# Patient Record
Sex: MALE | Race: WHITE | Employment: PART TIME | URBAN - METROPOLITAN AREA
[De-identification: names, ages, dates, MRNs, and addresses within clinical notes are randomized per-mention and may not be internally consistent; named-entity substitution may affect disease eponyms.]

---

## 2023-07-15 ENCOUNTER — HOSPITAL ENCOUNTER (EMERGENCY)
Age: 23
Discharge: HOME | End: 2023-07-15
Payer: COMMERCIAL

## 2023-07-15 ENCOUNTER — APPOINTMENT (OUTPATIENT)
Dept: GENERAL RADIOLOGY | Age: 23
End: 2023-07-15
Attending: NURSE PRACTITIONER
Payer: COMMERCIAL

## 2023-07-15 VITALS
DIASTOLIC BLOOD PRESSURE: 71 MMHG | TEMPERATURE: 98 F | WEIGHT: 165 LBS | HEIGHT: 72 IN | SYSTOLIC BLOOD PRESSURE: 120 MMHG | BODY MASS INDEX: 22.35 KG/M2 | RESPIRATION RATE: 16 BRPM | HEART RATE: 71 BPM | OXYGEN SATURATION: 98 %

## 2023-07-15 DIAGNOSIS — S61.325A: ICD-10-CM

## 2023-07-15 DIAGNOSIS — S61.213A LACERATION OF LEFT MIDDLE FINGER WITHOUT FOREIGN BODY WITHOUT DAMAGE TO NAIL, INITIAL ENCOUNTER: Primary | ICD-10-CM

## 2023-07-15 PROCEDURE — 73130 X-RAY EXAM OF HAND: CPT | Performed by: NURSE PRACTITIONER

## 2023-07-15 PROCEDURE — 99284 EMERGENCY DEPT VISIT MOD MDM: CPT

## 2023-07-15 PROCEDURE — 12002 RPR S/N/AX/GEN/TRNK2.6-7.5CM: CPT

## 2023-07-15 PROCEDURE — 96372 THER/PROPH/DIAG INJ SC/IM: CPT

## 2023-07-15 PROCEDURE — 90471 IMMUNIZATION ADMIN: CPT

## 2023-07-15 RX ORDER — CEFTRIAXONE 500 MG/1
1000 INJECTION, POWDER, FOR SOLUTION INTRAMUSCULAR; INTRAVENOUS ONCE
Status: COMPLETED | OUTPATIENT
Start: 2023-07-15 | End: 2023-07-15

## 2023-07-15 RX ORDER — CEPHALEXIN 500 MG/1
500 CAPSULE ORAL 4 TIMES DAILY
Qty: 40 CAPSULE | Refills: 0 | Status: SHIPPED | OUTPATIENT
Start: 2023-07-15 | End: 2023-07-25

## 2023-07-15 NOTE — DISCHARGE INSTRUCTIONS
Keep laceration site clean and dry at all times. Please have wound checked in 2 days. Sutures to be removed in 7 to 10 days.

## 2023-07-17 ENCOUNTER — OFFICE VISIT (OUTPATIENT)
Dept: FAMILY MEDICINE CLINIC | Facility: CLINIC | Age: 23
End: 2023-07-17
Payer: COMMERCIAL

## 2023-07-17 VITALS
SYSTOLIC BLOOD PRESSURE: 120 MMHG | BODY MASS INDEX: 21.4 KG/M2 | RESPIRATION RATE: 16 BRPM | HEART RATE: 74 BPM | WEIGHT: 158 LBS | HEIGHT: 72 IN | OXYGEN SATURATION: 98 % | DIASTOLIC BLOOD PRESSURE: 70 MMHG | TEMPERATURE: 97 F

## 2023-07-17 DIAGNOSIS — Z51.89 ENCOUNTER FOR POST-TRAUMATIC WOUND CHECK: Primary | ICD-10-CM

## 2023-07-17 PROCEDURE — 3078F DIAST BP <80 MM HG: CPT | Performed by: FAMILY MEDICINE

## 2023-07-17 PROCEDURE — 99213 OFFICE O/P EST LOW 20 MIN: CPT | Performed by: FAMILY MEDICINE

## 2023-07-17 PROCEDURE — 3008F BODY MASS INDEX DOCD: CPT | Performed by: FAMILY MEDICINE

## 2023-07-17 PROCEDURE — 3074F SYST BP LT 130 MM HG: CPT | Performed by: FAMILY MEDICINE

## 2023-07-26 ENCOUNTER — HOSPITAL ENCOUNTER (EMERGENCY)
Age: 23
Discharge: HOME | End: 2023-07-26
Payer: COMMERCIAL

## 2023-07-26 VITALS
OXYGEN SATURATION: 98 % | BODY MASS INDEX: 22 KG/M2 | WEIGHT: 165 LBS | SYSTOLIC BLOOD PRESSURE: 113 MMHG | DIASTOLIC BLOOD PRESSURE: 71 MMHG | RESPIRATION RATE: 16 BRPM | HEART RATE: 69 BPM | TEMPERATURE: 98 F

## 2023-07-26 DIAGNOSIS — Z48.02 ENCOUNTER FOR REMOVAL OF SUTURES: Primary | ICD-10-CM

## 2023-07-26 NOTE — DISCHARGE INSTRUCTIONS
Continue to keep wound clean, dry, covered. Follow-up with your primary care provider. Go to ER with any new or worsening symptoms.

## 2023-10-31 ENCOUNTER — TELEPHONE (OUTPATIENT)
Dept: ORTHOPEDICS CLINIC | Facility: CLINIC | Age: 23
End: 2023-10-31

## 2023-10-31 DIAGNOSIS — M25.551 BILATERAL HIP PAIN: Primary | ICD-10-CM

## 2023-10-31 DIAGNOSIS — M25.552 BILATERAL HIP PAIN: Primary | ICD-10-CM

## 2023-10-31 NOTE — TELEPHONE ENCOUNTER
Patient self scheduled with Isaurar Hakan on 11/1/23 for Previously had Achilles injury right leg, now hips out of line, pain in back and in leg left side. Please advise if Sincer will see all of this and if imaging is needed prior to his appointment.

## 2023-10-31 NOTE — TELEPHONE ENCOUNTER
Future Appointments   Date Time Provider Matilde Chaudhry   11/1/2023  7:40 AM AKASH Hernandez [facility] [id number]       This patient is coming for LT Hip out of line, Lower back, and LT Leg. No prior imaging done yet. Please advise if views are needed for this appt. Thanks.       Patient may be reached at [phone]

## 2023-10-31 NOTE — TELEPHONE ENCOUNTER
We will get imaging prior to appt. All may be interconnected but will need to start with one part. Please ask which body part bothers him most and will go from there.

## 2023-11-01 ENCOUNTER — OFFICE VISIT (OUTPATIENT)
Dept: ORTHOPEDICS CLINIC | Facility: CLINIC | Age: 23
End: 2023-11-01
Payer: COMMERCIAL

## 2023-11-01 ENCOUNTER — HOSPITAL ENCOUNTER (OUTPATIENT)
Dept: GENERAL RADIOLOGY | Age: 23
Discharge: HOME | End: 2023-11-01
Attending: PHYSICIAN ASSISTANT
Payer: COMMERCIAL

## 2023-11-01 ENCOUNTER — MED REC SCAN ONLY (OUTPATIENT)
Dept: ORTHOPEDICS CLINIC | Facility: CLINIC | Age: 23
End: 2023-11-01

## 2023-11-01 VITALS — BODY MASS INDEX: 23.03 KG/M2 | HEIGHT: 72 IN | WEIGHT: 170 LBS

## 2023-11-01 DIAGNOSIS — M25.551 BILATERAL HIP PAIN: ICD-10-CM

## 2023-11-01 DIAGNOSIS — M25.552 BILATERAL HIP PAIN: ICD-10-CM

## 2023-11-01 DIAGNOSIS — G57.02 PIRIFORMIS SYNDROME OF LEFT SIDE: Primary | ICD-10-CM

## 2023-11-01 DIAGNOSIS — M25.852 LEFT HIP IMPINGEMENT SYNDROME: ICD-10-CM

## 2023-11-01 PROCEDURE — 73523 X-RAY EXAM HIPS BI 5/> VIEWS: CPT | Performed by: PHYSICIAN ASSISTANT

## 2023-11-01 PROCEDURE — 99203 OFFICE O/P NEW LOW 30 MIN: CPT | Performed by: PHYSICIAN ASSISTANT

## 2023-11-01 PROCEDURE — 3008F BODY MASS INDEX DOCD: CPT | Performed by: PHYSICIAN ASSISTANT

## 2023-12-11 ENCOUNTER — TELEPHONE (OUTPATIENT)
Dept: ORTHOPEDICS CLINIC | Facility: CLINIC | Age: 23
End: 2023-12-11

## 2023-12-11 NOTE — TELEPHONE ENCOUNTER
Patient's plan of care from [facility] in [facility]. Please advise if he needs to be seen? Or can we send letter?      Pended for review    LOV 11/01/2023

## 2024-01-01 ENCOUNTER — MED REC SCAN ONLY (OUTPATIENT)
Dept: ORTHOPEDICS CLINIC | Facility: CLINIC | Age: 24
End: 2024-01-01

## 2024-07-02 ENCOUNTER — TELEPHONE (OUTPATIENT)
Dept: ORTHOPEDICS CLINIC | Facility: CLINIC | Age: 24
End: 2024-07-02

## 2024-07-02 DIAGNOSIS — S99.911A INJURY OF RIGHT ANKLE, INITIAL ENCOUNTER: Primary | ICD-10-CM

## 2024-07-03 ENCOUNTER — HOSPITAL ENCOUNTER (OUTPATIENT)
Dept: GENERAL RADIOLOGY | Age: 24
Discharge: HOME | End: 2024-07-03
Attending: PHYSICIAN ASSISTANT
Payer: COMMERCIAL

## 2024-07-03 ENCOUNTER — OFFICE VISIT (OUTPATIENT)
Dept: ORTHOPEDICS CLINIC | Facility: CLINIC | Age: 24
End: 2024-07-03
Payer: COMMERCIAL

## 2024-07-03 VITALS — HEIGHT: 72 IN | WEIGHT: 170 LBS | BODY MASS INDEX: 23.03 KG/M2

## 2024-07-03 DIAGNOSIS — Z98.890 S/P ACHILLES TENDON REPAIR: Primary | ICD-10-CM

## 2024-07-03 DIAGNOSIS — R29.898 ANKLE WEAKNESS: ICD-10-CM

## 2024-07-03 DIAGNOSIS — S99.911A INJURY OF RIGHT ANKLE, INITIAL ENCOUNTER: ICD-10-CM

## 2024-07-03 PROCEDURE — 99213 OFFICE O/P EST LOW 20 MIN: CPT | Performed by: PHYSICIAN ASSISTANT

## 2024-07-03 PROCEDURE — 73610 X-RAY EXAM OF ANKLE: CPT | Performed by: PHYSICIAN ASSISTANT

## 2024-07-03 NOTE — H&P
[facility] - ORTHOPEDICS  [address] [place] [id number]  [phone]     NEW PATIENT VISIT - HISTORY AND PHYSICAL EXAMINATION     Name: Dmitri Sarmiento   MRN: [id number]  Date: 7/3/2024     CC: Right ankle pain.     REFERRED BY: EMMANUEL FRANKLIN    HPI:   Dmitri Sarmiento is a very pleasant 23 year old male who presents today for evaluation, consultation, and management of RIGHT achilles injury since May 2021- and underwent surgical repair in which he underwent right achilles tendon repair by Dr. Jim Zhu in 2021. He complains of swelling, pain and sitffness. He complains of ongoing weakness and has completed PT.       PMH:   No past medical history on file.    PAST SURGICAL HX:  Past Surgical History:   Procedure Laterality Date    Achilles tendon repair Right 05/2021       FAMILY HX:  No family history on file.    ALLERGIES:  Patient has no known allergies.    MEDICATIONS:   No current outpatient medications on file.       ROS: A comprehensive 14 point review of systems was performed and was negative aside from the aforementioned per history of present illness.    SOCIAL HX:  Social History     Occupational History    Not on file   Tobacco Use    Smoking status: Never    Smokeless tobacco: Never   Vaping Use    Vaping status: Never Used   Substance and Sexual Activity    Alcohol use: Yes     Alcohol/week: 5.0 standard drinks of alcohol     Types: 5 Standard drinks or equivalent per week    Drug use: Never    Sexual activity: Not on file       PE:   There were no vitals filed for this visit.  Estimated body mass index is 23.06 kg/m² as calculated from the following:    Height as of 11/1/23: 6' (1.829 m).    Weight as of 11/1/23: 170 lb (77.1 kg).    Physical Exam  Constitutional:       Appearance: Normal appearance.   HENT:      Head: Normocephalic and atraumatic.   Eyes:      Extraocular Movements: Extraocular movements intact.   Neck:      Musculoskeletal: Normal range of  motion and neck supple.   Cardiovascular:      Pulses: Normal pulses.   Pulmonary:      Effort: Pulmonary effort is normal. No respiratory distress.   Abdominal:      General: There is no distension.   Skin:     General: Skin is warm.      Capillary Refill: Capillary refill takes less than 2 seconds.      Findings: No bruising.   Neurological:      General: No focal deficit present.      Mental Status: Alert.   Psychiatric:         Mood and Affect: Mood normal.     Examination of the right foot/ankle demonstrates:     Skin is intact, warm and dry.     Inspection:   Atrophy: none    Effusion: none    Edema: none    Erythema: none    Hematoma: none      Palpation:   Anterior Talo-Fibular Ligament (ATFL): none    Fibular Ligament (PTFL): none    Calcaneo-Fibular Ligament (CFL): none    Achilles Tendon: none    Peroneal Tendon: none    Posterior Tib Tendon: none    Talar dome: none    Metatarsal bones: none    Joint line tenderness: none  Crepitation: none     ROM:   Dorsiflexion: 10 degrees.  Plantarflexion: 40 degrees.  Eversion:  20 degrees  Inversion: 30 degrees.    Strength: mild weakness   Gastroc muslce atrophy - intact malin   Special Tests:   Anterior Drawer Test ATFL Rupture: Negative  CFL Forced Inversion: Negative   Talar Dome:  Negative   Gait:  normal   Leg length: equal and symmetric  Alignment:  neutral     No obvious peripheral edema noted.   Distal neurovascular exam demonstrates normal perfusion, intact sensation to light touch and full strength.           Radiographic Examination/Diagnostics:  I personally viewed, independently interpreted and radiology report was reviewed.    X-rays Right Ankle, 7/3/2024- No evidence of fracture, foreign body or soft tissue injury.     IMPRESSION: Dmitri Sarmiento is a 23 year old male who presents with right achilles pain and weakness following surgical intervention at OSH in 2021.     PLAN:   We had a detailed discussion outlining the etiology, anatomy,  pathophysiology, and natural history of the patient's findings. Imaging was reviewed in detail and correlated to a 3-dimensional model of the patient's pathology.     We reviewed the treatment of this disease condition.   We recommended physical therapy to aid in strengthening, range of motion, functional improvement, and return to baseline activity.  The patient had the opportunity to ask questions and all questions were answered appropriately.      FOLLOW-UP:  6-8 weeks, no imaging needed.             Sincer CARMITA Arndt, PA-C Orthopedic Surgery / Sports Medicine Specialist  [facility]  [address] [place] [address].org  [email or web address]  t: [phone]  o: [phone]  f: [phone]    This note was dictated using Dragon software.  While it was briefly proofread prior to completion, some grammatical, spelling, and word choice errors due to dictation may still occur.

## 2024-07-08 ENCOUNTER — TELEPHONE (OUTPATIENT)
Dept: ORTHOPEDICS CLINIC | Facility: CLINIC | Age: 24
End: 2024-07-08

## 2024-07-08 NOTE — TELEPHONE ENCOUNTER
Patient was seen in office on 7/3 for right achilles  and needs a Doctors note in order to put his gym membership on hold. Please advise

## 2024-08-14 ENCOUNTER — OFFICE VISIT (OUTPATIENT)
Dept: ORTHOPEDICS CLINIC | Facility: CLINIC | Age: 24
End: 2024-08-14
Payer: COMMERCIAL

## 2024-08-14 DIAGNOSIS — Z98.890 S/P ACHILLES TENDON REPAIR: Primary | ICD-10-CM

## 2024-08-14 PROCEDURE — 99213 OFFICE O/P EST LOW 20 MIN: CPT | Performed by: PHYSICIAN ASSISTANT

## 2024-08-15 ENCOUNTER — OFFICE VISIT (OUTPATIENT)
Dept: FAMILY MEDICINE CLINIC | Facility: CLINIC | Age: 24
End: 2024-08-15
Payer: COMMERCIAL

## 2024-08-15 VITALS
BODY MASS INDEX: 21.43 KG/M2 | DIASTOLIC BLOOD PRESSURE: 80 MMHG | HEIGHT: 72 IN | RESPIRATION RATE: 16 BRPM | HEART RATE: 80 BPM | OXYGEN SATURATION: 98 % | WEIGHT: 158.25 LBS | TEMPERATURE: 97 F | SYSTOLIC BLOOD PRESSURE: 124 MMHG

## 2024-08-15 DIAGNOSIS — Z13.220 SCREENING CHOLESTEROL LEVEL: ICD-10-CM

## 2024-08-15 DIAGNOSIS — Z00.00 WELL ADULT EXAM: Primary | ICD-10-CM

## 2024-08-15 DIAGNOSIS — M54.6 CHRONIC BILATERAL THORACIC BACK PAIN: ICD-10-CM

## 2024-08-15 DIAGNOSIS — Z13.31 NEGATIVE DEPRESSION SCREENING: ICD-10-CM

## 2024-08-15 DIAGNOSIS — G89.29 CHRONIC BILATERAL THORACIC BACK PAIN: ICD-10-CM

## 2024-08-15 PROCEDURE — 99395 PREV VISIT EST AGE 18-39: CPT | Performed by: STUDENT IN AN ORGANIZED HEALTH CARE EDUCATION/TRAINING PROGRAM

## 2024-08-15 RX ORDER — NAPROXEN 500 MG/1
500 TABLET ORAL 2 TIMES DAILY PRN
Qty: 14 TABLET | Refills: 0 | Status: SHIPPED | OUTPATIENT
Start: 2024-08-15 | End: 2024-08-22

## 2024-08-15 NOTE — PROGRESS NOTES
Subjective:      Chief Complaint   Patient presents with    Physical    Lymph Node     Swollen on groin area for a couple of weeks - states it seem to go away now     HISTORY OF PRESENT ILLNESS  HPI  HPI obtained per patient report.  Dmitri Sarmiento is a pleasant 23 year old male presenting for an annual physical.   He notes intermittent upper back and low back pain for years. He is currently attending PT s/p R achilles tendon repair.   He notes some swelling around the R groin area for a few weeks which is now resolved.     PAST PATIENT HISTORY  History reviewed. No pertinent past medical history.  Past Surgical History:   Procedure Laterality Date    Achilles tendon repair Right 05/2021    Other surgical history  May 2021    Achilles       CURRENT MEDICATIONS  Outpatient Medications Marked as Taking for the 8/15/24 encounter (Office Visit) with Srinivas Hodges MD   Medication Sig Dispense Refill    naproxen 500 MG Oral Tab Take 1 tablet (500 mg total) by mouth 2 (two) times daily as needed. 14 tablet 0       HEALTH MAINTENANCE  Immunization History   Administered Date(s) Administered    >=3 YRS TRI  MULTIDOSE VIAL (98683) FLU CLINIC 10/13/2012, 10/08/2014    Covid-19 Vaccine Pfizer 30 mcg/0.3 ml 03/20/2021, 04/10/2021, 12/24/2021    DTAP 12/16/2000, 02/22/2001, 04/20/2001, 12/21/2001, 08/03/2006    FLUZONE 6 months and older PFS 0.5 ml (45306) 09/07/2016, 10/19/2017, 10/26/2018, 08/28/2020, 10/27/2021    HEP B, Ped/Adol 10/09/2000, 11/10/2000, 12/21/2000    HIB (HbOC) 12/16/2000, 02/22/2001, 04/20/2001, 12/21/2001    Hpv Virus Vaccine 9 Joelle Im 10/19/2017, 08/01/2018, 08/12/2019    IPV 12/16/2000, 02/22/2001, 04/20/2001, 08/03/2006    Influenza 10/31/2015    MMR 12/21/2001, 08/03/2006    Meningococcal-Menactra 09/09/2013, 08/01/2018    Pneumococcal (Prevnar 7) 12/16/2000, 02/22/2001, 03/29/2001, 04/20/2001    TDAP 08/16/2012, 07/15/2023    Varicella 08/12/2004, 08/16/2012       ALLERGIES AND DRUG REACTIONS  No Known  Allergies    Family History   Problem Relation Age of Onset    Other (brain aneurysm) Paternal Grandmother     Stroke Paternal Grandfather      Social History     Socioeconomic History    Marital status: Single   Tobacco Use    Smoking status: Never    Smokeless tobacco: Never   Vaping Use    Vaping status: Never Used   Substance and Sexual Activity    Alcohol use: Yes     Comment: couple a week - has cut back    Drug use: Never     Social Determinants of Health      [facility]    Social Connections    Received from [facility], [facility]    Housing Stability       Review of Systems   All other systems reviewed and are negative.         Objective:      /80   Pulse 80   Temp 97.1 °F (36.2 °C) (Temporal)   Resp 16   Ht 6' (1.829 m)   Wt 158 lb 4 oz (71.8 kg)   SpO2 98%   BMI 21.46 kg/m²   Body mass index is 21.46 kg/m².    Physical Exam  Vitals reviewed.   Constitutional:       General: He is not in acute distress.     Appearance: He is normal weight. He is not ill-appearing, toxic-appearing or diaphoretic.   HENT:      Head: Normocephalic and atraumatic.   Eyes:      General: No scleral icterus.        Right eye: No discharge.         Left eye: No discharge.      Extraocular Movements: Extraocular movements intact.      Conjunctiva/sclera: Conjunctivae normal.   Neck:      Thyroid: No thyroid mass, thyromegaly or thyroid tenderness.   Cardiovascular:      Rate and Rhythm: Normal rate and regular rhythm.      Heart sounds: Normal heart sounds.   Pulmonary:      Effort: Pulmonary effort is normal.      Breath sounds: Normal breath sounds.   Abdominal:      General: There is no distension.      Palpations: Abdomen is soft. There is no mass.      Tenderness: There is no abdominal tenderness. There is no guarding or rebound.      Hernia: No hernia is present.   Musculoskeletal:         General: No  swelling, tenderness or deformity. Normal range of motion.      Cervical back: Neck supple. No tenderness.      Right lower leg: No edema.      Left lower leg: No edema.      Comments: Trapezius spasm    Lymphadenopathy:      Cervical: No cervical adenopathy.   Neurological:      Mental Status: He is alert and oriented to person, place, and time.   Psychiatric:         Mood and Affect: Mood normal.            Assessment and Plan:      1. Well adult exam (Primary)  -     CBC With Differential With Platelet; Future; Expected date: 08/15/2024  -     Comp Metabolic Panel (14); Future; Expected date: 08/15/2024  -     Lipid Panel; Future; Expected date: 08/15/2024  2. Screening cholesterol level  -     Lipid Panel; Future; Expected date: 08/15/2024  3. Negative depression screening  4. Chronic bilateral thoracic back pain  -     Naproxen; Take 1 tablet (500 mg total) by mouth 2 (two) times daily as needed.  Dispense: 14 tablet; Refill: 0    Return in about 1 year (around 8/15/2025) for physical.    - will update annual lab orders  - he is UTD on immunizations  - recommended asking his physical therapist about adding on therapies for his back pain. We discussed that he may take naproxen as needed for pain  - recommended follow-up if his R groin symptoms recur    Patient verbalized understanding of assessment and recommendations. All questions and concerns were addressed.    Electronically signed by Srinivas Hodges MD   Name band;

## 2024-09-17 ENCOUNTER — MED REC SCAN ONLY (OUTPATIENT)
Dept: ORTHOPEDICS CLINIC | Facility: CLINIC | Age: 24
End: 2024-09-17

## 2025-06-12 ENCOUNTER — TELEPHONE (OUTPATIENT)
Dept: ORTHOPEDICS CLINIC | Facility: CLINIC | Age: 25
End: 2025-06-12

## 2025-06-12 DIAGNOSIS — M79.641 RIGHT HAND PAIN: Primary | ICD-10-CM

## 2025-06-13 ENCOUNTER — OFFICE VISIT (OUTPATIENT)
Dept: ORTHOPEDICS CLINIC | Facility: CLINIC | Age: 25
End: 2025-06-13
Payer: COMMERCIAL

## 2025-06-13 ENCOUNTER — HOSPITAL ENCOUNTER (OUTPATIENT)
Dept: GENERAL RADIOLOGY | Age: 25
Discharge: HOME | End: 2025-06-13
Attending: PHYSICIAN ASSISTANT
Payer: COMMERCIAL

## 2025-06-13 VITALS — HEIGHT: 72 IN | WEIGHT: 160 LBS | BODY MASS INDEX: 21.67 KG/M2

## 2025-06-13 DIAGNOSIS — M79.641 RIGHT HAND PAIN: ICD-10-CM

## 2025-06-13 DIAGNOSIS — S62.646D CLOSED NONDISPLACED FRACTURE OF PROXIMAL PHALANX OF RIGHT LITTLE FINGER WITH ROUTINE HEALING, SUBSEQUENT ENCOUNTER: Primary | ICD-10-CM

## 2025-06-13 PROCEDURE — 73130 X-RAY EXAM OF HAND: CPT | Performed by: PHYSICIAN ASSISTANT

## 2025-06-13 PROCEDURE — 99213 OFFICE O/P EST LOW 20 MIN: CPT | Performed by: PHYSICIAN ASSISTANT

## 2025-06-13 NOTE — H&P
[facility] - ORTHOPEDICS  [address] [place] [id number]  [phone]     NEW PATIENT VISIT - HISTORY AND PHYSICAL EXAMINATION     Name: Dmitri Sarmiento   MRN: [id number]  Date: 6/13/2025     CC: Right hand/wrist pain.     REFERRED BY: Srinivas Hodges MD    HPI:   Dmitri Sarmiento is a very pleasant 24 year old right-hand dominant male who presents today for evaluation, consultation, and management of right hand injury that occurred 6-7 years ago after playing basketball in which she developed for many months pain.  He complains of stiffness, weakness and feelings of instability.  He has had recurrent pain recently and rates his pain to be a 6 out of 10.    PMH:   Past Medical History[1]    PAST SURGICAL HX:  Past Surgical History[2]    FAMILY HX:  Family History[3]    ALLERGIES:  Patient has no known allergies.    MEDICATIONS: Current Medications[4]    ROS: A complete 10 point review of systems performed and negative aside from the aforementioned per history of present illness.     SOCIAL HX:  Social History     Occupational History    Not on file   Tobacco Use    Smoking status: Never    Smokeless tobacco: Never   Vaping Use    Vaping status: Never Used   Substance and Sexual Activity    Alcohol use: Yes     Comment: couple a week - has cut back    Drug use: Never    Sexual activity: Not on file         PE:   Vitals:    06/13/25 0743   Weight: 160 lb (72.6 kg)   Height: 6' (1.829 m)     Estimated body mass index is 21.7 kg/m² as calculated from the following:    Height as of this encounter: 6' (1.829 m).    Weight as of this encounter: 160 lb (72.6 kg).    Physical Exam  Constitutional:       Appearance: Normal appearance.   HENT:      Head: Normocephalic and atraumatic.   Eyes:      Extraocular Movements: Extraocular movements intact.   Neck:      Musculoskeletal: Normal range of motion and neck supple.   Cardiovascular:      Pulses: Normal pulses.   Pulmonary:      Effort:  Pulmonary effort is normal. No respiratory distress.   Abdominal:      General: There is no distension.   Skin:     General: Skin is warm.      Capillary Refill: Capillary refill takes less than 2 seconds.      Findings: No bruising.   Neurological:      General: No focal deficit present.      Mental Status: Alert.   Psychiatric:         Mood and Affect: Mood normal.     Examination of the right hand/wrist demonstrates:     Skin is intact, warm and dry.     Inspection:   Atrophy: none    Effusion: none    Edema: none    Erythema: none    Hematoma: none    Nail changes:  none    Deformities: none      Palpation:   Radius: none    Ulna: none    Scaphoid: none    Lunate:  none    Triquetrum: none    Pisiform: none    Trapezium: none    Trapezoid: none    Capitate: none  Hamate: none   DRUJ: none   Dorsal Wrist: none   Saez Wrist: none   Metacarpals none   DIP: none   PIP: none     ROM:   Wrist: Full and symmetric   Fingers: Full and symmetric     Strength: normal     Special Tests:   Median Nerve: Negative  Ulnar Nerve: Negative   Radial Nerve:  Negative     No obvious peripheral edema noted.   Distal neurovascular exam demonstrates normal perfusion, intact sensation to light touch and full strength.     Examination of the contralateral hand/wrist demonstrates:  No significant atrophy, swelling or effusion. Full range of motion. Neurovascularly intact distally.      Radiographic Examination/Diagnostics:    I personally viewed, independently interpreted and radiology report was reviewed.    Narrative   PROCEDURE:  XR HAND (MIN 3 VIEWS), RIGHT (CPT=73130)     TECHNIQUE:  Three views of the right hand were obtained.     COMPARISON:  None.     INDICATIONS:  M79.641 Right hand pain     PATIENT STATED HISTORY: (As transcribed by Technologist)  Patient is having an orthopedic evaluation.  Patient stated he injured his Right 5th finger during basketball as a child but never had treatment. He complains of pain in his Right  5th finger and  metacarpal, as well as the ulnar side of his Right wrist.         FINDINGS:  There is a bony exostosis noted along the anterolateral aspect of the distal diaphysis of the proximal phalanx of the 5th digit.  The bony exostosis measures 6 x 3 mm.  The patient describes an injury to the 5th finger.  The findings could  reflect posttraumatic change.  An osteo chondroma is also within the differential.  Well corticated ossific densities also seen along the posterior medial aspect of the PIP joint of the 5th digit.  This does not have the appearance of an acute chip  fracture may reflect changes related to an old chip fracture.  There is no subluxation or dislocation.  Joint spaces are maintained.                   Impression   CONCLUSION:    1. Possible osteo chondroma or posttraumatic change seen to the proximal phalanx of the 5th digit.    2. Small ossific density noted along the medial aspect of the PIP joint of the 5th digit likely reflects an old chip fracture.  There is no acute fracture, subluxation or dislocation.       IMPRESSION: Dmitri Sarmiento is a 24 year old Right hand dominant male RIGHT hand old 5th proximal phalanx fracture, doing well.     PLAN:   We had a detailed discussion outlining the etiology, anatomy, pathophysiology, and natural history of the patient's findings. Imaging was reviewed in detail and correlated to a 3-dimensional model of the patient's pathology.     We reviewed the treatment of this disease condition.  We recommended physical therapy to aid in strengthening, range of motion, functional improvement, and return to baseline activity.  The patient had the opportunity to ask questions and all questions were answered appropriately.      FOLLOW-UP:   Return to clinic on an as needed basis.           Jeanette Mcclendon, CARMITA, PA-C Orthopedic Surgery / Sports Medicine Specialist  [facility]  [address] [place] [id number]   [email or web address]   [email or web address]  t: [phone]  o: [phone]  f: [phone]    This note was dictated using Dragon software.  While it was briefly proofread prior to completion, some grammatical, spelling, and word choice errors due to dictation may still occur.           [1] History reviewed. No pertinent past medical history.  [2]   Past Surgical History:  Procedure Laterality Date    Achilles tendon repair Right 05/2021    Other surgical history  May 2021    Achilles   [3]   Family History  Problem Relation Age of Onset    Other (brain aneurysm) Paternal Grandmother     Stroke Paternal Grandfather    [4]   No current outpatient medications on file.

## 2025-07-03 ENCOUNTER — MED REC SCAN ONLY (OUTPATIENT)
Facility: CLINIC | Age: 25
End: 2025-07-03

## 2025-08-13 ENCOUNTER — MED REC SCAN ONLY (OUTPATIENT)
Dept: ORTHOPEDICS CLINIC | Facility: CLINIC | Age: 25
End: 2025-08-13

## 2025-08-15 ENCOUNTER — MED REC SCAN ONLY (OUTPATIENT)
Dept: ORTHOPEDICS CLINIC | Facility: CLINIC | Age: 25
End: 2025-08-15

## 2025-08-26 ENCOUNTER — OFFICE VISIT (OUTPATIENT)
Dept: FAMILY MEDICINE CLINIC | Facility: CLINIC | Age: 25
End: 2025-08-26

## 2025-08-26 VITALS
HEIGHT: 72 IN | HEART RATE: 95 BPM | SYSTOLIC BLOOD PRESSURE: 140 MMHG | TEMPERATURE: 99 F | DIASTOLIC BLOOD PRESSURE: 92 MMHG | WEIGHT: 165 LBS | BODY MASS INDEX: 22.35 KG/M2 | OXYGEN SATURATION: 99 % | RESPIRATION RATE: 16 BRPM

## 2025-08-26 DIAGNOSIS — R22.1 LUMP IN NECK: ICD-10-CM

## 2025-08-26 DIAGNOSIS — M54.2 NECK PAIN: Primary | ICD-10-CM

## 2025-08-26 PROCEDURE — 99213 OFFICE O/P EST LOW 20 MIN: CPT

## (undated) NOTE — LETTER
Date: 7/8/2024    Patient Name: Dmitri Sarmiento          To Whom it may concern:    This letter has been written at the patient's request. The above patient was seen at [facility] for treatment of a medical condition.    Please allow patient to put gym membership on hold starting on 7/3/24 for 8 weeks.     Sincerely,    Sincer AKASH Mcclendon

## (undated) NOTE — LETTER
Date: 11/1/2023    Patient Name: Matthew Pablo          To Whom it may concern: This letter has been written at the patient's request. The above patient was seen at the [facility] for treatment of a medical condition. The patient can return to seated work only for four weeks. At that time he can return to all work without restrictions as tolerated. Sincerely,          CARMITA Talbot, GRETCHEN Orthopedic Surgery / Sports Medicine Specialist  [facility]  [address]   [facility]. [facility]  [email or web address]  t: [phone]  o: [phone]  f: [phone]          This note was dictated using Dragon software. While it was briefly proofread prior to completion, some grammatical, spelling, and word choice errors due to dictation may still occur.